# Patient Record
Sex: MALE | Race: WHITE | NOT HISPANIC OR LATINO | URBAN - METROPOLITAN AREA
[De-identification: names, ages, dates, MRNs, and addresses within clinical notes are randomized per-mention and may not be internally consistent; named-entity substitution may affect disease eponyms.]

---

## 2023-07-11 ENCOUNTER — OFFICE VISIT (OUTPATIENT)
Dept: PRIMARY CARE | Facility: CLINIC | Age: 38
End: 2023-07-11
Payer: COMMERCIAL

## 2023-07-11 VITALS
DIASTOLIC BLOOD PRESSURE: 66 MMHG | BODY MASS INDEX: 26.98 KG/M2 | HEIGHT: 72 IN | WEIGHT: 199.2 LBS | RESPIRATION RATE: 17 BRPM | OXYGEN SATURATION: 98 % | SYSTOLIC BLOOD PRESSURE: 126 MMHG | HEART RATE: 84 BPM

## 2023-07-11 DIAGNOSIS — R21 SKIN RASH: Primary | ICD-10-CM

## 2023-07-11 PROBLEM — R50.9 ELEVATED TEMPERATURE: Status: ACTIVE | Noted: 2023-07-11

## 2023-07-11 PROBLEM — J11.1 INFLUENZAL ACUTE UPPER RESPIRATORY INFECTION: Status: ACTIVE | Noted: 2023-07-11

## 2023-07-11 PROBLEM — M79.89 LEG SWELLING: Status: ACTIVE | Noted: 2023-07-11

## 2023-07-11 PROBLEM — S63.501A WRIST SPRAIN, RIGHT, INITIAL ENCOUNTER: Status: ACTIVE | Noted: 2023-07-11

## 2023-07-11 PROBLEM — G43.809 MIGRAINE VARIANT WITH HEADACHE: Status: ACTIVE | Noted: 2023-07-11

## 2023-07-11 PROBLEM — J00 COMMON COLD: Status: ACTIVE | Noted: 2023-07-11

## 2023-07-11 PROBLEM — B09 VIRAL EXANTHEM: Status: ACTIVE | Noted: 2023-07-11

## 2023-07-11 PROBLEM — S63.502A WRIST SPRAIN, LEFT, INITIAL ENCOUNTER: Status: ACTIVE | Noted: 2023-07-11

## 2023-07-11 PROBLEM — J30.9 ALLERGIC RHINITIS: Status: ACTIVE | Noted: 2023-07-11

## 2023-07-11 PROBLEM — K21.00 GASTROESOPHAGEAL REFLUX DISEASE WITH ESOPHAGITIS: Status: ACTIVE | Noted: 2023-07-11

## 2023-07-11 PROBLEM — B00.9 HERPES: Status: ACTIVE | Noted: 2023-07-11

## 2023-07-11 PROBLEM — M79.10 MYALGIA: Status: ACTIVE | Noted: 2023-07-11

## 2023-07-11 PROBLEM — M54.50 CHRONIC LOWER BACK PAIN: Status: ACTIVE | Noted: 2023-07-11

## 2023-07-11 PROBLEM — E83.52 HYPERCALCEMIA: Status: ACTIVE | Noted: 2023-07-11

## 2023-07-11 PROBLEM — A49.01 MSSA (METHICILLIN SUSCEPTIBLE STAPHYLOCOCCUS AUREUS) INFECTION: Status: ACTIVE | Noted: 2023-07-11

## 2023-07-11 PROBLEM — R30.0 DYSURIA: Status: ACTIVE | Noted: 2023-07-11

## 2023-07-11 PROBLEM — R52 TENDERNESS: Status: ACTIVE | Noted: 2023-07-11

## 2023-07-11 PROBLEM — R10.2 SUPRAPUBIC ABDOMINAL PAIN: Status: ACTIVE | Noted: 2023-07-11

## 2023-07-11 PROBLEM — R00.2 PALPITATIONS: Status: ACTIVE | Noted: 2023-07-11

## 2023-07-11 PROBLEM — D72.819 LEUKOPENIA: Status: ACTIVE | Noted: 2023-07-11

## 2023-07-11 PROBLEM — J34.89 RHINORRHEA: Status: ACTIVE | Noted: 2023-07-11

## 2023-07-11 PROBLEM — T78.2XXA ANAPHYLACTIC REACTION: Status: ACTIVE | Noted: 2023-07-11

## 2023-07-11 PROBLEM — E66.3 OVERWEIGHT (BMI 25.0-29.9): Status: ACTIVE | Noted: 2023-07-11

## 2023-07-11 PROBLEM — J45.909 ASTHMA (HHS-HCC): Status: ACTIVE | Noted: 2023-07-11

## 2023-07-11 PROBLEM — G89.29 CHRONIC LOWER BACK PAIN: Status: ACTIVE | Noted: 2023-07-11

## 2023-07-11 PROBLEM — R53.83 FATIGUE: Status: ACTIVE | Noted: 2023-07-11

## 2023-07-11 PROBLEM — E78.00 HYPERCHOLESTEREMIA: Status: ACTIVE | Noted: 2023-07-11

## 2023-07-11 PROBLEM — J20.9 ACUTE BRONCHITIS: Status: ACTIVE | Noted: 2023-07-11

## 2023-07-11 PROCEDURE — 87070 CULTURE OTHR SPECIMN AEROBIC: CPT

## 2023-07-11 PROCEDURE — 87205 SMEAR GRAM STAIN: CPT

## 2023-07-11 PROCEDURE — 87075 CULTR BACTERIA EXCEPT BLOOD: CPT

## 2023-07-11 PROCEDURE — 87529 HSV DNA AMP PROBE: CPT

## 2023-07-11 PROCEDURE — 1036F TOBACCO NON-USER: CPT | Performed by: STUDENT IN AN ORGANIZED HEALTH CARE EDUCATION/TRAINING PROGRAM

## 2023-07-11 PROCEDURE — 99213 OFFICE O/P EST LOW 20 MIN: CPT | Performed by: STUDENT IN AN ORGANIZED HEALTH CARE EDUCATION/TRAINING PROGRAM

## 2023-07-11 RX ORDER — SULFAMETHOXAZOLE AND TRIMETHOPRIM 800; 160 MG/1; MG/1
1 TABLET ORAL 2 TIMES DAILY
Qty: 28 TABLET | Refills: 0 | Status: SHIPPED | OUTPATIENT
Start: 2023-07-11 | End: 2023-07-25

## 2023-07-11 RX ORDER — VALACYCLOVIR HYDROCHLORIDE 1 G/1
TABLET, FILM COATED ORAL
COMMUNITY
Start: 2021-03-09

## 2023-07-11 RX ORDER — MUPIROCIN 20 MG/G
OINTMENT TOPICAL 3 TIMES DAILY
Qty: 22 G | Refills: 0 | Status: SHIPPED | OUTPATIENT
Start: 2023-07-11 | End: 2023-07-21

## 2023-07-11 RX ORDER — ALBUTEROL SULFATE 90 UG/1
AEROSOL, METERED RESPIRATORY (INHALATION)
COMMUNITY
Start: 2019-01-14

## 2023-07-11 RX ORDER — EPINEPHRINE 0.3 MG/.3ML
0.3 INJECTION SUBCUTANEOUS
COMMUNITY
Start: 2020-11-06

## 2023-07-11 ASSESSMENT — PATIENT HEALTH QUESTIONNAIRE - PHQ9
SUM OF ALL RESPONSES TO PHQ9 QUESTIONS 1 AND 2: 0
1. LITTLE INTEREST OR PLEASURE IN DOING THINGS: NOT AT ALL
2. FEELING DOWN, DEPRESSED OR HOPELESS: NOT AT ALL

## 2023-07-11 NOTE — LETTER
July 13, 2023     Patient: Usman Alvarez Jr.   YOB: 1985   Date of Visit: 7/11/2023       To Whom It May Concern:    Usman Alvarez was seen in my clinic on 7/11/2023 at 8:40 am. Please excuse Usman for his absence from work on this until 7/14/2023.  to make the appointment.    If you have any questions or concerns, please don't hesitate to call.         Sincerely,         Charlee Jay,         CC: No Recipients

## 2023-07-11 NOTE — PROGRESS NOTES
Subjective   Patient ID: Usman Alvarez Jr. is a 37 y.o. male who presents for Rash (Pt states the rash on his stomach started about Friday of last week. 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        Patient presents today after noticing a rash after he was performing some water sports.  He did use an unknown LifeVest during this time.      Abdominal spot rash  Itching  Was outdoors, hiking,   Thought it was a heat rash   Using doxycyline from 3 years ago x 4 doses       Rash  This is a new problem. The current episode started in the past 7 days. The problem is unchanged. The affected locations include the abdomen. The rash is characterized by blistering and itchiness. It is unknown if there was an exposure to a precipitant. Past treatments include antibiotics and antibiotic cream. The treatment provided no relief.       Review of Systems   Skin:  Positive for rash.       Objective   Physical Exam  Skin:             Assessment/Plan   Diagnoses and all orders for this visit:  Skin rash  Comments:  staph vs viral   cultures taken   [facility] sent  Orders:  -     Tissue/Wound Culture/Smear; Future  -     mupirocin (Bactroban) 2 % ointment; Apply topically 3 times a day for 10 days. apply to affected area  -     sulfamethoxazole-trimethoprim (Bactrim DS) 800-160 mg tablet; Take 1 tablet by mouth 2 times a day for 14 days.  -     HSV PCR; Future

## 2023-07-11 NOTE — LETTER
July 11, 2023     Patient: Usman Alvarez Jr.   YOB: 1985   Date of Visit: 7/11/2023       To Whom It May Concern:    Usman Alvarez was seen in my clinic on 7/11/2023 at 8:40 am. Please excuse Usman for his absence from work on this day to make the appointment.    If you have any questions or concerns, please don't hesitate to call.         Sincerely,         Charlee Jay,         CC: No Recipients

## 2023-07-12 LAB
HSV 1 PCR QUAL, SKIN/MUCOSA: NOT DETECTED
HSV 2 PCR QUAL, SKIN/MUCOSA: NOT DETECTED

## 2023-07-13 ENCOUNTER — TELEPHONE (OUTPATIENT)
Dept: PRIMARY CARE | Facility: CLINIC | Age: 38
End: 2023-07-13
Payer: COMMERCIAL

## 2023-07-13 NOTE — TELEPHONE ENCOUNTER
----- Message from Charlee Jay DO sent at 7/13/2023  1:33 PM EDT -----  Redid the note   ----- Message -----  From: Lou Samaniego CMA  Sent: 7/13/2023   9:39 AM EDT  To: Charlee Jay DO    Pt stated the the itching is really bad and he was up till like 3:30-4 this morning. Pt called off work due to it. Pt wants to know if you would write him an excuse for missing work today.  ----- Message -----  From: Charlee Jay DO  Sent: 7/13/2023   7:17 AM EDT  To: Lou Samaniego CMA    Skin culture negative. Shows some mixed bacteria. Continue with your current antibiotic course.

## 2023-07-19 LAB
GRAM STN SPEC: NORMAL
TISSUE/WOUND CULTURE/SMEAR: NORMAL

## 2023-08-21 ENCOUNTER — APPOINTMENT (OUTPATIENT)
Dept: PRIMARY CARE | Facility: CLINIC | Age: 38
End: 2023-08-21
Payer: COMMERCIAL

## 2023-10-27 ENCOUNTER — HOSPITAL ENCOUNTER (EMERGENCY)
Facility: HOSPITAL | Age: 38
Discharge: HOME | End: 2023-10-27
Attending: EMERGENCY MEDICINE
Payer: COMMERCIAL

## 2023-10-27 VITALS
SYSTOLIC BLOOD PRESSURE: 144 MMHG | HEIGHT: 72 IN | RESPIRATION RATE: 16 BRPM | DIASTOLIC BLOOD PRESSURE: 81 MMHG | HEART RATE: 63 BPM | BODY MASS INDEX: 27.09 KG/M2 | WEIGHT: 200 LBS | OXYGEN SATURATION: 100 % | TEMPERATURE: 98.2 F
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
## 2023-10-27 DIAGNOSIS — S00.01XA ABRASION OF SCALP, INITIAL ENCOUNTER: ICD-10-CM

## 2023-10-27 PROCEDURE — 99283 EMERGENCY DEPT VISIT LOW MDM: CPT | Performed by: EMERGENCY MEDICINE

## 2023-10-27 RX ORDER — CYCLOBENZAPRINE HCL 10 MG
5 TABLET ORAL 3 TIMES DAILY PRN
Qty: 15 TABLET | Refills: 0 | Status: SHIPPED | OUTPATIENT
Start: 2023-10-27 | End: 2023-11-01 | Stop reason: SINTOL

## 2023-10-27 ASSESSMENT — PAIN SCALES - GENERAL
PAINLEVEL_OUTOF10: 4
PAINLEVEL_OUTOF10: 4

## 2023-10-27 ASSESSMENT — PAIN DESCRIPTION - LOCATION
LOCATION_2: KNEE
LOCATION: HEAD

## 2023-10-27 ASSESSMENT — COLUMBIA-SUICIDE SEVERITY RATING SCALE - C-SSRS
6. HAVE YOU EVER DONE ANYTHING, STARTED TO DO ANYTHING, OR PREPARED TO DO ANYTHING TO END YOUR LIFE?: NO
1. IN THE PAST MONTH, HAVE YOU WISHED YOU WERE DEAD OR WISHED YOU COULD GO TO SLEEP AND NOT WAKE UP?: NO
2. HAVE YOU ACTUALLY HAD ANY THOUGHTS OF KILLING YOURSELF?: NO

## 2023-10-27 ASSESSMENT — PAIN - FUNCTIONAL ASSESSMENT: PAIN_FUNCTIONAL_ASSESSMENT: 0-10

## 2023-10-27 ASSESSMENT — PAIN DESCRIPTION - ORIENTATION: ORIENTATION_2: RIGHT
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
## 2023-11-01 ENCOUNTER — ANCILLARY PROCEDURE (OUTPATIENT)
Dept: RADIOLOGY | Facility: CLINIC | Age: 38
End: 2023-11-01
Payer: COMMERCIAL

## 2023-11-01 ENCOUNTER — OFFICE VISIT (OUTPATIENT)
Dept: PRIMARY CARE | Facility: CLINIC | Age: 38
End: 2023-11-01
Payer: COMMERCIAL

## 2023-11-01 VITALS
SYSTOLIC BLOOD PRESSURE: 116 MMHG | RESPIRATION RATE: 17 BRPM | DIASTOLIC BLOOD PRESSURE: 74 MMHG | OXYGEN SATURATION: 97 % | HEART RATE: 76 BPM | HEIGHT: 72 IN | BODY MASS INDEX: 28.06 KG/M2 | WEIGHT: 207.2 LBS

## 2023-11-01 DIAGNOSIS — M54.9 ACUTE BACK PAIN, UNSPECIFIED BACK LOCATION, UNSPECIFIED BACK PAIN LATERALITY: ICD-10-CM

## 2023-11-01 DIAGNOSIS — M25.562 ACUTE PAIN OF BOTH KNEES: ICD-10-CM

## 2023-11-01 DIAGNOSIS — R41.840 INATTENTION: ICD-10-CM
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
## 2023-11-01 DIAGNOSIS — M25.561 ACUTE PAIN OF BOTH KNEES: ICD-10-CM
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
## 2023-11-01 PROCEDURE — 72050 X-RAY EXAM NECK SPINE 4/5VWS: CPT

## 2023-11-01 PROCEDURE — 99214 OFFICE O/P EST MOD 30 MIN: CPT | Performed by: STUDENT IN AN ORGANIZED HEALTH CARE EDUCATION/TRAINING PROGRAM

## 2023-11-01 PROCEDURE — 72050 X-RAY EXAM NECK SPINE 4/5VWS: CPT | Performed by: RADIOLOGY

## 2023-11-01 PROCEDURE — 72072 X-RAY EXAM THORAC SPINE 3VWS: CPT | Mod: FY

## 2023-11-01 PROCEDURE — 1036F TOBACCO NON-USER: CPT | Performed by: STUDENT IN AN ORGANIZED HEALTH CARE EDUCATION/TRAINING PROGRAM

## 2023-11-01 PROCEDURE — 73564 X-RAY EXAM KNEE 4 OR MORE: CPT | Mod: BILATERAL PROCEDURE | Performed by: RADIOLOGY

## 2023-11-01 PROCEDURE — 73564 X-RAY EXAM KNEE 4 OR MORE: CPT | Mod: 50

## 2023-11-01 PROCEDURE — 72074 X-RAY EXAM THORAC SPINE4/>VW: CPT | Performed by: RADIOLOGY

## 2023-11-01 RX ORDER — METHOCARBAMOL 500 MG/1
500 TABLET, FILM COATED ORAL 4 TIMES DAILY PRN
Qty: 40 TABLET | Refills: 0 | Status: SHIPPED | OUTPATIENT
Start: 2023-11-01 | End: 2023-12-31

## 2023-11-01 ASSESSMENT — PATIENT HEALTH QUESTIONNAIRE - PHQ9
1. LITTLE INTEREST OR PLEASURE IN DOING THINGS: NOT AT ALL
2. FEELING DOWN, DEPRESSED OR HOPELESS: NOT AT ALL
SUM OF ALL RESPONSES TO PHQ9 QUESTIONS 1 AND 2: 0
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
## 2023-11-02 ENCOUNTER — TELEPHONE (OUTPATIENT)
Dept: PRIMARY CARE | Facility: CLINIC | Age: 38
End: 2023-11-02
Payer: COMMERCIAL

## 2023-11-10 ENCOUNTER — HOSPITAL ENCOUNTER (OUTPATIENT)
Dept: RADIOLOGY | Facility: HOSPITAL | Age: 38
Discharge: HOME | End: 2023-11-10
Payer: COMMERCIAL
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
## 2023-11-10 DIAGNOSIS — R41.840 INATTENTION: ICD-10-CM

## 2023-11-10 PROCEDURE — 70450 CT HEAD/BRAIN W/O DYE: CPT

## 2023-11-10 PROCEDURE — 70450 CT HEAD/BRAIN W/O DYE: CPT | Performed by: STUDENT IN AN ORGANIZED HEALTH CARE EDUCATION/TRAINING PROGRAM

## 2024-03-07 ENCOUNTER — TELEPHONE (OUTPATIENT)
Dept: PRIMARY CARE | Facility: CLINIC | Age: 39
End: 2024-03-07
Payer: COMMERCIAL

## 2025-09-02 ENCOUNTER — APPOINTMENT (OUTPATIENT)
Dept: PRIMARY CARE | Facility: CLINIC | Age: 40
End: 2025-09-02
Payer: COMMERCIAL